# Patient Record
Sex: FEMALE | Race: WHITE | Employment: FULL TIME | URBAN - METROPOLITAN AREA
[De-identification: names, ages, dates, MRNs, and addresses within clinical notes are randomized per-mention and may not be internally consistent; named-entity substitution may affect disease eponyms.]

---

## 2017-01-20 RX ORDER — VALACYCLOVIR HYDROCHLORIDE 1 G/1
1000 TABLET, FILM COATED ORAL 2 TIMES DAILY
Qty: 20 TABLET | Refills: 0 | Status: SHIPPED | OUTPATIENT
Start: 2017-01-20 | End: 2017-02-22 | Stop reason: SDUPTHER

## 2017-01-25 RX ORDER — PREDNISONE 20 MG/1
TABLET ORAL
Qty: 10 TABLET | Refills: 0 | Status: SHIPPED | OUTPATIENT
Start: 2017-01-25 | End: 2017-09-25

## 2017-02-22 ENCOUNTER — TELEPHONE (OUTPATIENT)
Dept: INTERNAL MEDICINE CLINIC | Age: 47
End: 2017-02-22

## 2017-02-22 RX ORDER — VALACYCLOVIR HYDROCHLORIDE 1 G/1
1000 TABLET, FILM COATED ORAL 2 TIMES DAILY
Qty: 20 TABLET | Refills: 0 | Status: SHIPPED | OUTPATIENT
Start: 2017-02-22 | End: 2017-03-22

## 2017-02-22 RX ORDER — VALACYCLOVIR HYDROCHLORIDE 1 G/1
1000 TABLET, FILM COATED ORAL 2 TIMES DAILY
Qty: 20 TABLET | Refills: 0 | Status: SHIPPED | OUTPATIENT
Start: 2017-02-22 | End: 2017-02-22 | Stop reason: SDUPTHER

## 2017-03-22 RX ORDER — VALACYCLOVIR HYDROCHLORIDE 1 G/1
TABLET, FILM COATED ORAL
Qty: 20 TABLET | Refills: 0 | Status: SHIPPED | OUTPATIENT
Start: 2017-03-22 | End: 2017-05-30 | Stop reason: SDUPTHER

## 2017-05-31 RX ORDER — VALACYCLOVIR HYDROCHLORIDE 1 G/1
TABLET, FILM COATED ORAL
Qty: 20 TABLET | Refills: 0 | Status: SHIPPED | OUTPATIENT
Start: 2017-05-31 | End: 2017-08-29 | Stop reason: SDUPTHER

## 2017-06-28 ENCOUNTER — TELEPHONE (OUTPATIENT)
Dept: INTERNAL MEDICINE CLINIC | Age: 47
End: 2017-06-28

## 2017-08-29 RX ORDER — VALACYCLOVIR HYDROCHLORIDE 1 G/1
TABLET, FILM COATED ORAL
Qty: 20 TABLET | Refills: 0 | Status: SHIPPED | OUTPATIENT
Start: 2017-08-29 | End: 2017-11-06 | Stop reason: SDUPTHER

## 2017-09-11 ENCOUNTER — TELEPHONE (OUTPATIENT)
Dept: INTERNAL MEDICINE CLINIC | Age: 47
End: 2017-09-11

## 2017-09-11 DIAGNOSIS — Z00.00 ROUTINE GENERAL MEDICAL EXAMINATION AT A HEALTH CARE FACILITY: Primary | ICD-10-CM

## 2017-09-25 ENCOUNTER — OFFICE VISIT (OUTPATIENT)
Dept: INTERNAL MEDICINE CLINIC | Age: 47
End: 2017-09-25

## 2017-09-25 VITALS
HEIGHT: 62 IN | BODY MASS INDEX: 25.76 KG/M2 | SYSTOLIC BLOOD PRESSURE: 124 MMHG | WEIGHT: 140 LBS | DIASTOLIC BLOOD PRESSURE: 68 MMHG

## 2017-09-25 DIAGNOSIS — Z82.49 FAMILY HISTORY OF HEART DISEASE: ICD-10-CM

## 2017-09-25 DIAGNOSIS — Z00.00 ROUTINE GENERAL MEDICAL EXAMINATION AT A HEALTH CARE FACILITY: Primary | ICD-10-CM

## 2017-09-25 DIAGNOSIS — R00.2 PALPITATION: ICD-10-CM

## 2017-09-25 DIAGNOSIS — R51.9 HEADACHE, UNSPECIFIED HEADACHE TYPE: ICD-10-CM

## 2017-09-25 DIAGNOSIS — M54.5 CHRONIC RIGHT-SIDED LOW BACK PAIN, WITH SCIATICA PRESENCE UNSPECIFIED: ICD-10-CM

## 2017-09-25 DIAGNOSIS — R42 DIZZINESS: ICD-10-CM

## 2017-09-25 DIAGNOSIS — E78.2 MIXED HYPERLIPIDEMIA: ICD-10-CM

## 2017-09-25 DIAGNOSIS — G89.29 CHRONIC RIGHT-SIDED LOW BACK PAIN, WITH SCIATICA PRESENCE UNSPECIFIED: ICD-10-CM

## 2017-09-25 PROCEDURE — 99396 PREV VISIT EST AGE 40-64: CPT | Performed by: NURSE PRACTITIONER

## 2017-09-25 PROCEDURE — 93000 ELECTROCARDIOGRAM COMPLETE: CPT | Performed by: NURSE PRACTITIONER

## 2017-09-25 RX ORDER — MELOXICAM 15 MG/1
15 TABLET ORAL DAILY
COMMUNITY
End: 2019-03-11 | Stop reason: CLARIF

## 2017-09-25 ASSESSMENT — ENCOUNTER SYMPTOMS
BACK PAIN: 1
DIARRHEA: 0
COUGH: 0
VOMITING: 0
FACIAL SWELLING: 0
NAUSEA: 0
SORE THROAT: 0
SINUS PRESSURE: 0

## 2017-10-02 ENCOUNTER — TELEPHONE (OUTPATIENT)
Dept: INTERNAL MEDICINE CLINIC | Age: 47
End: 2017-10-02

## 2017-10-04 ENCOUNTER — TELEPHONE (OUTPATIENT)
Dept: INTERNAL MEDICINE CLINIC | Age: 47
End: 2017-10-04

## 2017-10-11 ENCOUNTER — TELEPHONE (OUTPATIENT)
Dept: INTERNAL MEDICINE CLINIC | Age: 47
End: 2017-10-11

## 2017-10-11 NOTE — TELEPHONE ENCOUNTER
[phone] Dr. Cristy Rodriguez office did receive and they are going to reach out to patient today to go over information and get her in for evaluation by Dr Valeri Ang . [phone]  for patient, informed if she does not hear from Dr. Armando Gonzalez office in the next day to let me know.

## 2017-10-24 ENCOUNTER — OFFICE VISIT (OUTPATIENT)
Dept: ORTHOPEDIC SURGERY | Age: 47
End: 2017-10-24

## 2017-10-24 ENCOUNTER — TELEPHONE (OUTPATIENT)
Dept: INTERNAL MEDICINE CLINIC | Age: 47
End: 2017-10-24

## 2017-10-24 VITALS
WEIGHT: 139.99 LBS | SYSTOLIC BLOOD PRESSURE: 128 MMHG | DIASTOLIC BLOOD PRESSURE: 74 MMHG | BODY MASS INDEX: 26.43 KG/M2 | HEART RATE: 70 BPM | HEIGHT: 61 IN

## 2017-10-24 DIAGNOSIS — M43.06 SPONDYLOLYSIS OF LUMBAR REGION: Primary | ICD-10-CM

## 2017-10-24 PROCEDURE — 99243 OFF/OP CNSLTJ NEW/EST LOW 30: CPT | Performed by: ORTHOPAEDIC SURGERY

## 2017-10-24 NOTE — PROGRESS NOTES
lumbar spine is normal without a surgical scar. She has 5/5 strength of EHLs, FHLs, foot evertors, ankle dorsiflexors, plantarflexors, quadriceps, hamstrings, iliopsoas, abductors and adductors about the hips bilaterally. She has a negative straight leg raise, bilaterally. Achilles and quadriceps reflexes are 1+. Sensation is intact to light touch L3 to S1 bilaterally. She has no clonus. Hip range of motion painless. Imaging:  I reviewed MRI images of her lumbar spine. They show disc degeneration L4-L5 with a right L4 pars defect. Assessment:  Lumbar degenerative disc disease L4-L5 with right L4 pars defect    Plan:  We discussed treatment options including observation, additional oral steroids, physical therapy, branch blocks, chiropractic care and decompression with fusion. I recommended she obtain the CT images and drop them off at her office, try chiropractic care and see Dr. Hardeep Brunson for possible injections. She may be a candidate for surgery if her symptoms persist after those. We may need to repeat her lumbar MRI of her considering surgery.

## 2017-11-06 ENCOUNTER — TELEPHONE (OUTPATIENT)
Dept: INTERNAL MEDICINE CLINIC | Age: 47
End: 2017-11-06

## 2017-11-06 RX ORDER — VALACYCLOVIR HYDROCHLORIDE 1 G/1
TABLET, FILM COATED ORAL
Qty: 20 TABLET | Refills: 0 | Status: SHIPPED | OUTPATIENT
Start: 2017-11-06 | End: 2018-01-29 | Stop reason: SDUPTHER

## 2017-11-07 NOTE — TELEPHONE ENCOUNTER
[phone] (home) [phone] (work)  [phone]   for patient to return call. Would like to know how her appointment with Dr. Estill Bernheim went.

## 2018-01-29 RX ORDER — VALACYCLOVIR HYDROCHLORIDE 1 G/1
TABLET, FILM COATED ORAL
Qty: 20 TABLET | Refills: 0 | Status: SHIPPED | OUTPATIENT
Start: 2018-01-29 | End: 2018-03-02 | Stop reason: SDUPTHER

## 2018-02-01 ENCOUNTER — OFFICE VISIT (OUTPATIENT)
Dept: INTERNAL MEDICINE CLINIC | Age: 48
End: 2018-02-01

## 2018-02-01 VITALS
OXYGEN SATURATION: 98 % | WEIGHT: 137 LBS | DIASTOLIC BLOOD PRESSURE: 78 MMHG | BODY MASS INDEX: 25.47 KG/M2 | HEART RATE: 68 BPM | SYSTOLIC BLOOD PRESSURE: 106 MMHG

## 2018-02-01 DIAGNOSIS — B00.1 COLD SORE: Primary | ICD-10-CM

## 2018-02-01 DIAGNOSIS — R21 RASH: ICD-10-CM

## 2018-02-01 PROCEDURE — 99213 OFFICE O/P EST LOW 20 MIN: CPT | Performed by: INTERNAL MEDICINE

## 2018-02-02 ENCOUNTER — TELEPHONE (OUTPATIENT)
Dept: INTERNAL MEDICINE CLINIC | Age: 48
End: 2018-02-02

## 2018-02-02 RX ORDER — PREDNISONE 20 MG/1
TABLET ORAL
Qty: 19 TABLET | Refills: 0 | Status: SHIPPED | OUTPATIENT
Start: 2018-02-02 | End: 2019-03-11 | Stop reason: CLARIF

## 2018-02-02 NOTE — PROGRESS NOTES
Subjective:      Patient ID: Kennedy Nesbitt is a 52 y.o. female. CC: Cold sores  HPI: Patient notes recent trip to [place]. She complains of her lips being \"super dry\" then followed by cold sores. History of cold sores. Patient also notes \"itchy bumps\" about her arms and back which have improved somewhat. She complains of possibility of eating shrimp on Sunday also using sunscreen and \"lip balm\". Patient  started on Valtrex yesterday. Patient relates a history of cold sores after sun exposure. Medicines and allergies reviewed  Health maintenance reviewed    Review of Systems    Review of Systems   Constitutional: negative   HENT:  Cold sores. EYES: negative   Respiratory: negative   Gastrointestinal: negative   Endocrine: negative   Musculoskeletal: negative   Skin:  Mild skin rash. Allergic/Immunological: negative   Hematological: negative   Psychiatric/Behavorial: negative   CV: negative   CNS: negative   :Negative   S/E:Negative  Renal: Negative      Objective:   Physical Exam: Lungs: Clear to auscultation. CV: S1-S2 normal.  RAJANI. Carotid: No bruit. Head/neck: Neck: No lymphadenopathy. Thyroid: Not palpable. Ears: Canals clear. TM without erythema or bulging. Throat: No erythema or exudates. Small ulcerated area left anterior tongue close to the tip. Lips notable for cold sores right lower lip. Skin: Mild minimally raised mostly flesh-colored but a few erythematous areas about upper arms. Spine/extremities: No ankle edema  Blood pressure 106/78, pulse 68, weight 137 lb (62.1 kg), SpO2 98 %, unknown if currently breastfeeding. Assessment:      1. Cold sores right lower lip. 2.  Cold sore tongue. 3.  Skin rash: Minimal  4. Sun exposure: Previous trigger of cold sores         Plan:      1. Continue with Valtrex  2. Referral to allergist  3.   Cold compresses to the area  Dr. Brennan Doe

## 2018-03-02 RX ORDER — VALACYCLOVIR HYDROCHLORIDE 1 G/1
TABLET, FILM COATED ORAL
Qty: 20 TABLET | Refills: 0 | Status: SHIPPED | OUTPATIENT
Start: 2018-03-02 | End: 2018-05-21 | Stop reason: SDUPTHER

## 2018-03-02 NOTE — TELEPHONE ENCOUNTER
She was told to take 4 pills a day at her allergist office so she did take them all. She is going to [facility] on March 21st and she just like to have a script with her because this happened before and she just likes to have it on hand. They thought she was allergic to shellfish but they tested her and she was fine so they are still unsure of why it happened.

## 2018-03-02 NOTE — TELEPHONE ENCOUNTER
Refill request for valtrex medication.      [facility]    Last visit - 2-1-2018     Pending visit - none    Last refill -1-    Medication Contract signed -   Wilfrid mtz-     Additional Comments

## 2018-05-21 RX ORDER — VALACYCLOVIR HYDROCHLORIDE 1 G/1
TABLET, FILM COATED ORAL
Qty: 20 TABLET | Refills: 0 | Status: SHIPPED | OUTPATIENT
Start: 2018-05-21 | End: 2018-09-06 | Stop reason: SDUPTHER

## 2018-09-07 RX ORDER — VALACYCLOVIR HYDROCHLORIDE 1 G/1
TABLET, FILM COATED ORAL
Qty: 20 TABLET | Refills: 0 | Status: SHIPPED | OUTPATIENT
Start: 2018-09-07 | End: 2018-12-21 | Stop reason: SDUPTHER

## 2018-09-07 NOTE — TELEPHONE ENCOUNTER
Refill request for valtrex  medication.      [facility]    Last visit - 2-1-18     Pending visit - 5-21-18    Last refill -5-21-18    Medication Contract signed -   Wilfrid mtz-     Additional Comments

## 2018-12-21 ENCOUNTER — TELEPHONE (OUTPATIENT)
Dept: INTERNAL MEDICINE CLINIC | Age: 48
End: 2018-12-21

## 2018-12-21 RX ORDER — VALACYCLOVIR HYDROCHLORIDE 1 G/1
TABLET, FILM COATED ORAL
Qty: 20 TABLET | Refills: 0 | Status: SHIPPED | OUTPATIENT
Start: 2018-12-21 | End: 2019-04-30 | Stop reason: SDUPTHER

## 2019-03-11 ENCOUNTER — HOSPITAL ENCOUNTER (OUTPATIENT)
Age: 49
Discharge: HOME | End: 2019-03-11
Payer: COMMERCIAL

## 2019-03-11 ENCOUNTER — OFFICE VISIT (OUTPATIENT)
Dept: INTERNAL MEDICINE CLINIC | Age: 49
End: 2019-03-11
Payer: COMMERCIAL

## 2019-03-11 VITALS
BODY MASS INDEX: 26.77 KG/M2 | WEIGHT: 144 LBS | DIASTOLIC BLOOD PRESSURE: 64 MMHG | HEART RATE: 63 BPM | OXYGEN SATURATION: 99 % | SYSTOLIC BLOOD PRESSURE: 122 MMHG

## 2019-03-11 DIAGNOSIS — E78.2 MIXED HYPERLIPIDEMIA: ICD-10-CM

## 2019-03-11 DIAGNOSIS — Z00.00 ROUTINE GENERAL MEDICAL EXAMINATION AT A HEALTH CARE FACILITY: Primary | ICD-10-CM

## 2019-03-11 DIAGNOSIS — Z00.00 ROUTINE GENERAL MEDICAL EXAMINATION AT A HEALTH CARE FACILITY: ICD-10-CM

## 2019-03-11 DIAGNOSIS — B00.1 COLD SORE: ICD-10-CM

## 2019-03-11 LAB
A/G RATIO: 1.8 (ref 1.1–2.2)
ALBUMIN SERPL-MCNC: 4.6 G/DL (ref 3.4–5)
ALP BLD-CCNC: 27 U/L (ref 40–129)
ALT SERPL-CCNC: 21 U/L (ref 10–40)
ANION GAP SERPL CALCULATED.3IONS-SCNC: 14 MMOL/L (ref 3–16)
AST SERPL-CCNC: 29 U/L (ref 15–37)
BASOPHILS ABSOLUTE: 0.1 K/UL (ref 0–0.2)
BASOPHILS RELATIVE PERCENT: 1 %
BILIRUB SERPL-MCNC: 0.3 MG/DL (ref 0–1)
BUN BLDV-MCNC: 11 MG/DL (ref 7–20)
CALCIUM SERPL-MCNC: 9.1 MG/DL (ref 8.3–10.6)
CHLORIDE BLD-SCNC: 102 MMOL/L (ref 99–110)
CHOLESTEROL, TOTAL: 205 MG/DL (ref 0–199)
CO2: 26 MMOL/L (ref 21–32)
CREAT SERPL-MCNC: 0.8 MG/DL (ref 0.6–1.1)
EOSINOPHILS ABSOLUTE: 0.1 K/UL (ref 0–0.6)
EOSINOPHILS RELATIVE PERCENT: 2 %
GFR AFRICAN AMERICAN: >60
GFR NON-AFRICAN AMERICAN: >60
GLOBULIN: 2.5 G/DL
GLUCOSE BLD-MCNC: 80 MG/DL (ref 70–99)
HCT VFR BLD CALC: 40.6 % (ref 36–48)
HDLC SERPL-MCNC: 47 MG/DL (ref 40–60)
HEMOGLOBIN: 13.4 G/DL (ref 12–16)
LDL CHOLESTEROL CALCULATED: 128 MG/DL
LYMPHOCYTES ABSOLUTE: 2 K/UL (ref 1–5.1)
LYMPHOCYTES RELATIVE PERCENT: 39.6 %
MCH RBC QN AUTO: 29.9 PG (ref 26–34)
MCHC RBC AUTO-ENTMCNC: 33.1 G/DL (ref 31–36)
MCV RBC AUTO: 90.4 FL (ref 80–100)
MONOCYTES ABSOLUTE: 0.5 K/UL (ref 0–1.3)
MONOCYTES RELATIVE PERCENT: 9.8 %
NEUTROPHILS ABSOLUTE: 2.4 K/UL (ref 1.7–7.7)
NEUTROPHILS RELATIVE PERCENT: 47.6 %
PDW BLD-RTO: 13.1 % (ref 12.4–15.4)
PLATELET # BLD: 272 K/UL (ref 135–450)
PMV BLD AUTO: 8.7 FL (ref 5–10.5)
POTASSIUM SERPL-SCNC: 4.2 MMOL/L (ref 3.5–5.1)
RBC # BLD: 4.49 M/UL (ref 4–5.2)
SODIUM BLD-SCNC: 142 MMOL/L (ref 136–145)
TOTAL PROTEIN: 7.1 G/DL (ref 6.4–8.2)
TRIGL SERPL-MCNC: 150 MG/DL (ref 0–150)
VLDLC SERPL CALC-MCNC: 30 MG/DL
WBC # BLD: 5 K/UL (ref 4–11)

## 2019-03-11 PROCEDURE — 36415 COLL VENOUS BLD VENIPUNCTURE: CPT

## 2019-03-11 PROCEDURE — 80053 COMPREHEN METABOLIC PANEL: CPT

## 2019-03-11 PROCEDURE — 85025 COMPLETE CBC W/AUTO DIFF WBC: CPT

## 2019-03-11 PROCEDURE — 99396 PREV VISIT EST AGE 40-64: CPT | Performed by: NURSE PRACTITIONER

## 2019-03-11 PROCEDURE — 80061 LIPID PANEL: CPT

## 2019-03-11 ASSESSMENT — PATIENT HEALTH QUESTIONNAIRE - PHQ9
SUM OF ALL RESPONSES TO PHQ QUESTIONS 1-9: 0
1. LITTLE INTEREST OR PLEASURE IN DOING THINGS: 0
SUM OF ALL RESPONSES TO PHQ9 QUESTIONS 1 & 2: 0
SUM OF ALL RESPONSES TO PHQ QUESTIONS 1-9: 0
2. FEELING DOWN, DEPRESSED OR HOPELESS: 0

## 2019-03-12 ASSESSMENT — ENCOUNTER SYMPTOMS
SORE THROAT: 0
DIARRHEA: 0
NAUSEA: 0
FACIAL SWELLING: 0
VOMITING: 0
SINUS PRESSURE: 0
COUGH: 0

## 2019-04-30 RX ORDER — VALACYCLOVIR HYDROCHLORIDE 1 G/1
TABLET, FILM COATED ORAL
Qty: 20 TABLET | Refills: 0 | Status: SHIPPED | OUTPATIENT
Start: 2019-04-30 | End: 2019-09-27 | Stop reason: SDUPTHER

## 2019-09-30 RX ORDER — VALACYCLOVIR HYDROCHLORIDE 1 G/1
TABLET, FILM COATED ORAL
Qty: 20 TABLET | Refills: 0 | Status: SHIPPED | OUTPATIENT
Start: 2019-09-30 | End: 2019-11-06 | Stop reason: SDUPTHER

## 2019-11-06 RX ORDER — VALACYCLOVIR HYDROCHLORIDE 1 G/1
TABLET, FILM COATED ORAL
Qty: 20 TABLET | Refills: 0 | Status: SHIPPED | OUTPATIENT
Start: 2019-11-06 | End: 2020-02-17

## 2020-02-17 RX ORDER — VALACYCLOVIR HYDROCHLORIDE 1 G/1
TABLET, FILM COATED ORAL
Qty: 20 TABLET | Refills: 0 | Status: SHIPPED | OUTPATIENT
Start: 2020-02-17 | End: 2020-05-01

## 2020-02-17 NOTE — TELEPHONE ENCOUNTER
Refill request for valcyclovir  medication.      [facility]     Last visit - 3-11-19     Pending visit - none    Last refill -11-6-19    Medication Contract signed -   Wilfrid mtz-     Additional Comments

## 2020-05-01 RX ORDER — VALACYCLOVIR HYDROCHLORIDE 1 G/1
TABLET, FILM COATED ORAL
Qty: 20 TABLET | Refills: 0 | Status: SHIPPED | OUTPATIENT
Start: 2020-05-01 | End: 2020-08-21

## 2020-08-21 RX ORDER — VALACYCLOVIR HYDROCHLORIDE 1 G/1
TABLET, FILM COATED ORAL
Qty: 20 TABLET | Refills: 0 | Status: SHIPPED | OUTPATIENT
Start: 2020-08-21

## 2020-08-21 NOTE — TELEPHONE ENCOUNTER
Refill request for valtrex medication.      [facility]    Last visit - 3/11/20     Pending visit -     Last refill -5/1/20

## 2020-08-31 ENCOUNTER — NEW PATIENT COMPREHENSIVE (OUTPATIENT)
Dept: URBAN - METROPOLITAN AREA CLINIC 35 | Facility: CLINIC | Age: 50
End: 2020-08-31

## 2020-08-31 DIAGNOSIS — H52.203: ICD-10-CM

## 2020-08-31 DIAGNOSIS — H52.13: ICD-10-CM

## 2020-08-31 DIAGNOSIS — H52.4: ICD-10-CM

## 2020-08-31 PROCEDURE — 92004 COMPRE OPH EXAM NEW PT 1/>: CPT

## 2020-08-31 PROCEDURE — 92015 DETERMINE REFRACTIVE STATE: CPT

## 2020-08-31 ASSESSMENT — VISUAL ACUITY
OU_SC: 20/40
OD_SC: 20/50
OU_SC: 20/25+2
OS_SC: 20/80
OS_SC: 20/30-2
OD_SC: 20/25-1

## 2020-08-31 ASSESSMENT — TONOMETRY
OD_IOP_MMHG: 16
OS_IOP_MMHG: 13

## 2020-12-10 NOTE — PATIENT DISCUSSION
Recommended observation. Change in vision is due to cataracts. New glasses won't significantly improve vision.

## 2021-02-23 NOTE — PATIENT DISCUSSION
The patient was informed that with the Basic option, they will most likely need prescription glasses at all focal points after surgery. The patient elects Basic IOL OS, goal of emmetropia.

## 2021-04-05 NOTE — PATIENT DISCUSSION
Cataract surgery has been performed in the first eye and activities of daily living are still impaired. The patient would like to proceed with cataract surgery in the second eye as scheduled. The patient elects Basic IOL OS, goal duarte.

## 2021-06-29 NOTE — PROCEDURE NOTE: CLINICAL
PROCEDURE NOTE: Probing of Lacrimal Canaliculi, With or Without Irrigation OS. Diagnosis: Acquired Stenosis of Nasolacrimal Duct. Prep: Betadine Flush. Risks, benefits and alternatives discussed. The patient desires to proceed with probe and irrigation of the involved puncta today and the puncta/lacrimal system was found to be patent/blocked. See chart plan notes for further discussion. Patient tolerated the procedure well and left in good condition. Maricruz Steven

## 2021-06-29 NOTE — PATIENT DISCUSSION
Recommended observation. Change in vision is due to cataracts. New glasses won't significantly improve vision. Regarding: [place]-Post Op 04/16/2021-Excruciating pain and purple toes  ----- Message from Shaila Linn sent at 4/23/2021  7:11 PM CDT -----  Patient Name: Phu Navarro    Full Name of Provider seen for current symptoms: Aaron SHIN Lonnie    Pregnant (If Yes, how long?):No    Symptoms: Post Op 04/16/2021-Excruciating pain and purple toes    Do you or any of your household members have the following symptoms:  Fever >100.0#F or >38.0#C: No    New or worsening cough, shortness of breath, sore throat, congestion, or runny nose: No    New onset of nausea, vomiting or diarrhea: No    New onset of loss of taste or smell, chills, repeated shaking with chills, muscle pain, or headache: No    Have you, a household member, or another person you have been in contact with tested positive for COVID-19 in the last 14 days?: No    Call Back #: [phone]    Call Center Account #:[id number]    Which State are you currently located in? (enter State name in Summary field): [place]    Please update the Demographics section with the patients permanent resident address     Emergent COVID-19 Symptoms requiring Nurse Triage:  Trouble breathing, Persistent pain or pressure in the chest, New confusion, Inability to wake or stay awake, Bluish lips or face

## 2021-06-29 NOTE — PATIENT DISCUSSION
Probing and Irrigation performed today.  Blockage noted, return to lower and upper punctum with minimal flush through to throat.

## 2022-07-08 ENCOUNTER — ESTABLISHED PATIENT (OUTPATIENT)
Dept: URBAN - METROPOLITAN AREA CLINIC 35 | Facility: CLINIC | Age: 52
End: 2022-07-08

## 2022-07-08 DIAGNOSIS — H52.13: ICD-10-CM

## 2022-07-08 DIAGNOSIS — H52.203: ICD-10-CM

## 2022-07-08 DIAGNOSIS — H52.4: ICD-10-CM

## 2022-07-08 PROCEDURE — 92015 DETERMINE REFRACTIVE STATE: CPT

## 2022-07-08 PROCEDURE — 92014 COMPRE OPH EXAM EST PT 1/>: CPT

## 2022-07-08 ASSESSMENT — VISUAL ACUITY
OU_SC: 20/25
OD_SC: 20/25-1
OD_SC: J5
OS_SC: 20/25-1
OS_SC: J5
OU_SC: J4

## 2022-07-08 ASSESSMENT — TONOMETRY
OD_IOP_MMHG: 14
OS_IOP_MMHG: 13

## 2024-01-15 ENCOUNTER — COMPREHENSIVE EXAM (OUTPATIENT)
Dept: URBAN - METROPOLITAN AREA CLINIC 35 | Facility: CLINIC | Age: 54
End: 2024-01-15

## 2024-01-15 DIAGNOSIS — H52.203: ICD-10-CM

## 2024-01-15 DIAGNOSIS — H52.13: ICD-10-CM

## 2024-01-15 DIAGNOSIS — H52.4: ICD-10-CM

## 2024-01-15 PROCEDURE — 92015 DETERMINE REFRACTIVE STATE: CPT

## 2024-01-15 PROCEDURE — 92014 COMPRE OPH EXAM EST PT 1/>: CPT

## 2024-01-15 ASSESSMENT — TONOMETRY
OD_IOP_MMHG: 15
OS_IOP_MMHG: 15

## 2024-01-15 ASSESSMENT — VISUAL ACUITY
OS_SC: 20/30-2
OD_SC: 20/20-2
OU_CC: J2
OS_CC: J2-
OD_CC: J2-
OU_SC: 20/20-1

## 2024-06-12 NOTE — PATIENT DISCUSSION
[facility] - Neurosurgical Spine Follow-up      Chief Complaint   Patient presents with    Neurologic Problem     In person f/u- MRI, Injections           History of Present Illness:  Federica Mora is a 59 year old female who presents with chronic and progressive neck, right greater than left, pain that has been present for intermittently for 15 years but worse over the last year and acutely worse 1 week ago, is 4-10/10 in pain.  There is neck and upper arm down to the elbows itching sensation and numbness and tingling associated.  Pain is exacerbated with activities such as bending, walking and is relieved by cervical ALVARADO C5 temporarily.     Symptoms are:   [x]  Constant  []  Comes and goes      Type of pain:  [x]  Sharp  []  Dull  []  Throbbing  [x]  Ache  []  Shooting  []  Burning  []  Numbness/Tingling             Interferes with:  []  Sitting  []  Standing  [x]  Bending  [x]  Walking  []  Sleeping  [x]  Work  [x]  Recreation             Treatments tried:  [x]  Medication  []  Exercise  [x]  Physical Therapy  []  Chiropractor  []  Acupuncture  [x]  Steroid Injections  []  Surgery       Patient has used pain medications, including nonsteroidal anti-inflammatory medications, muscle relaxants, to try to control the pain.  Pain management with corticosteroid injections have been performed.  The benefit of these interventions has been short-lived or provided minimal relief.    She has undergone physical therapy with minimal benefit.     Pain and discomfort continue to significantly and adversely impact quality of life by limiting activities of daily living.    Gait imbalance, bowel or bladder incontinence are denied.     Most Recent Physical Therapy:  Completed within the past year   Most Recent Pain Management:  injections, diclofenac, norco, tizanidine      Diagnosis:  Patient Active Problem List   Diagnosis    ADD (attention deficit disorder)    DDD (degenerative disc disease), cervical    OSVALDO (obstructive sleep  Retinal tear and detachment warning symptoms reviewed and patient instructed to call immediately if increasing floaters, flashes, or decreasing peripheral vision. apnea)    Osteoarthrosis    Acquired hypothyroidism    Deviated septum    PLMD (periodic limb movement disorder)    History of colon polyps    DDD (degenerative disc disease), lumbar    Seropositive rheumatoid arthritis  (CMD)    Arthritis of right sternoclavicular joint    Long-term use of hydroxychloroquine         REVIEW OF SYSTEMS:    A complete Review of Systems was completed and positives noted in the MA's document, all others negative.    Past Medical History:   Diagnosis Date    Brachioradial pruritus     Deviated septum     s/p septoplasty    History of colon polyps 05/22/2014    tubular adenoma    hypothyroidism     Normal US thyroid on 7/11/12    OSVALDO (obstructive sleep apnea)     Improved s/p septoplasty    Osteoarthritis     PLMD (periodic limb movement disorder)     Polyarthritis with positive rheumatoid factor  (CMD)      Past Surgical History:   Procedure Laterality Date    Hernia repair      Nasal septum surgery      2022     ALLERGIES:  No Known Allergies  Current Outpatient Medications   Medication Sig Dispense Refill    diclofenac (VOLTAREN) 75 MG EC tablet Take 1 tablet by mouth 2 times daily as needed (joint pain). 60 tablet 0    DULoxetine (CYMBALTA) 30 MG capsule Take 1 capsule by mouth daily. Take with 60 mg cap daily 90 capsule 0    levothyroxine 100 MCG tablet Take 1 tablet by mouth daily. 90 tablet 1    ALPRAZolam (XANAX) 0.25 MG tablet Take 1-2 tablets by mouth 30 minutes prior to flight 6 tablet 0    nirmatrelvir & ritonavir 300mg/100mg (PAXLOVID) 20 x 150 MG & 10 x 100MG Take 3 tablets by mouth in the morning and 3 tablets in the evening. Nirmatrelvir 300 mg with ritonavir 100 mg, administered together, twice daily for 5 days. 30 each 0    tiZANidine (ZANAFLEX) 2 MG tablet Take 1 tablet by mouth at bedtime as needed for Muscle spasms. 30 tablet 0    Multiple Vitamins-Minerals (MULTIVITAMIN WOMEN 50+ PO)       Omega-3 Fatty Acids (OMEGA-3 EPA FISH OIL PO)       gabapentin (NEURONTIN) 600  MG tablet Take 1 tablet by mouth in the morning and 1 tablet at noon and 1 tablet in the evening. 90 tablet 6    DULoxetine (CYMBALTA) 60 MG capsule Take 1 capsule by mouth daily. 90 capsule 1    hydroxychloroquine (PLAQUENIL) 200 MG tablet Take 1 tablet by mouth daily. 90 tablet 3    COVID-19 mRNA bivalent, Pfizer, (Pfizer COVID-19 Vac Bivalent) 30 MCG/0.3ML Suspension Inject 0.3 mLs into the muscle once. 0.3 mL 0    influenza virus quadrivalent vaccine inactivated, PRESERVATIVE FREE, (Flulaval Quadrivalent) 0.5 ML injection Inject 0.5 mLs into the muscle 1 time for 1 dose 0.5 mL 0    ferrous sulfate 325 (65 FE) MG tablet ferrous sulfate 325 mg (65 mg iron) tablet   TK 1 T PO  QD      LORazepam (ATIVAN) 0.5 MG tablet lorazepam 0.5 mg tablet      triamcinolone (NASACORT) 55 MCG/ACT nasal inhaler Use 2 sprays into each nostril daily. 16.9 mL 11    ciclopirox (PENLAC) 8 % topical solution Apply topically nightly. 6.6 mL 0    zoster vaccine recomb adjuvanted (Shingrix) 50 MCG/0.5ML injection Repeat dose in 2 to 6 months (unless 1 dose already given), for a total of 2 doses. 1 each 1     No current facility-administered medications for this visit.      Social History     Socioeconomic History    Marital status: /Civil Union     Spouse name: Not on file    Number of children: Not on file    Years of education: Not on file    Highest education level: Not on file   Occupational History    Occupation: Teacher   Tobacco Use    Smoking status: Former     Current packs/day: 1.00     Average packs/day: 1 pack/day for 45.4 years (45.4 ttl pk-yrs)     Types: Cigarettes     Start date: 1979    Smokeless tobacco: Never   Vaping Use    Vaping status: Former   Substance and Sexual Activity    Alcohol use: Yes     Alcohol/week: 18.0 standard drinks of alcohol     Types: 18 Glasses of wine per week     Comment: \"couple glasses of wine most nights\", Occasional martini    Drug use: Never    Sexual activity: Yes     Partners: Male      Birth control/protection: Post-menopausal   Other Topics Concern    Not on file   Social History Narrative    Not on file     Social Determinants of Health     Financial Resource Strain: Not on file   Food Insecurity: Not on file   Transportation Needs: Not on file   Physical Activity: Not on file   Stress: Not on file   Social Connections: Not on file   Interpersonal Safety: Not on file     Family History   Problem Relation Age of Onset    Osteoporosis Mother     Hyperlipidemia Mother     Cancer, Prostate Father     Cancer, Breast Sister 58        HER2+    Rheumatoid Arthritis Maternal Grandmother     Multiple Sclerosis Paternal Grandmother     Brain Aneurysm Paternal Grandfather        Physical Exam:  Visit Vitals  Ht 5' 2\" (1.575 m)   Wt 54.9 kg (121 lb)   BMI 22.13 kg/m²     Awake, alert, fully oriented  EOMI  Face symmetric  Tongue motion normal   HEENT within normal limits, neck supple  Heart regular  Abdomen soft  Extremities no cyanosis, clubbing or edema  Skin intact  2/4 reflexes to biceps, brachioradialis, patellar and Achilles  No Dugan  No clonus  Gait stable  Sensory stable  Jobes neg right  Spurlings neg right    Motor  Upper Extremity   Left Right   Deltoid 5/5 5/5   Biceps 5/5 5/5   Triceps 5/5 5/5   Hand  5/5 5/5       Lower Extremity    Left Right   Iliopsoas 5/5 5/5   Quadriceps 5/5 5/5   Hamstrings 5/5 5/5   Tibialis anterior 5/5 5/5   Extensor hallucis longus 5/5 5/5   Gastrocnemius/  soleus 5/5 5/5        IMAGING  MRI:  Independent visualization and independent interpretation of the image:    MRI Cervical  Degen c3-t1 bilateral foraminal stenosis C5-7 significant facet edema C3-5 on the right    MRI Lumbar  Degen scoli L3-s1 bialteral foramial stenosis    Assessment:  Federica Mora presents for evaluation of right greater than left neck pain with abnormal sensation of itching, numbness, and tingling in the neck and upper arms down to the elbows.  She has had neck and low back  pain intermittently for 15 years but 1 year ago neck pain became constant with additional progression 1 weeks ago.  She has underwent cervical ALVARADO with some temporary relief.  These findings are concordant with the MRI findings as reviewed above.  The MRI findings were reviewed and explained, with the patient acknowledging understanding of this discussion. She has cervical degeneration c3-t1 bilateral foraminal stenosis C5-7 significant facet edema C3-5 on the right and lumbar spine with degenerative scoliosis with degeneration L3-s1 bialteral foramial stenosis.  To further investigate the neck pain recommend bilateral C5-7 facet joint injections diagnostic and therapeutic. Patient underwent C4-6 facet joint injections with patient reports some improvement in pain.  Discussed addressing C3-7, as opposed to C3-T1 given some degeneration C7.T1, which would require a vertical incision.  She would like to proceed with C3-7 at this time and hold on C7/T1.      Federica Mora has failed medical pain management, interventional pain management with ALVARADO, as well as physical therapy.  The latter significantly provokes pain and discomfort.    At this time, Federica Mora would like to proceed with surgery of anterior cervical discectomy and fusion C3-7 with bone marrow possible add level.      We discussed risks of surgery which include but are not limited to: anesthetic complication; pulmonary embolism; cardiac arrest; bleeding requiring blood transfusion; infection; CSF leak; damage to the spinal nerves and/or cord resulting in paralysis, loss of bowel, bladder or sexual function; failure to relieve pain; increase in pain; excessive scar tissue formation; reduction in the range of motion; failure of hardware; need for further surgery; and death.  All questions were answered and consent was obtained.    We anticipate that the patient will be able to work up until surgery.  After surgery we expect time off to be 12  weeks.  This time may change depending on postoperative healing course. The patient will have lifting and activity restrictions for 6 weeks after surgery and this may also change depending on healing.             Plan/Recommendations:  Surgery scheduler to call  -Will try to identify a surgery date at the beginning of July, patient requests first week of July  PCP for clearance  Follow-up post-op      Instructions:  Instructed the patient that in the event that symptoms change, to contact the office or present to the local emergency room.     On  06/12/24, IDerrick APNP scribed the services personally performed by Arnold Garcia DO     The documentation recorded by the scribe accurately and completely reflects the service(s) I personally performed and the decisions made by me.      Arnold Garcia DO  Office:  [phone]

## 2025-01-20 ENCOUNTER — PREPPED CHART (OUTPATIENT)
Age: 55
End: 2025-01-20

## 2025-03-03 ENCOUNTER — COMPREHENSIVE EXAM (OUTPATIENT)
Age: 55
End: 2025-03-03

## 2025-03-03 DIAGNOSIS — H52.203: ICD-10-CM

## 2025-03-03 DIAGNOSIS — H52.13: ICD-10-CM

## 2025-03-03 DIAGNOSIS — H52.4: ICD-10-CM

## 2025-03-03 PROCEDURE — 92015 DETERMINE REFRACTIVE STATE: CPT

## 2025-03-03 PROCEDURE — 92014 COMPRE OPH EXAM EST PT 1/>: CPT
